# Patient Record
Sex: FEMALE | Race: WHITE | URBAN - NONMETROPOLITAN AREA
[De-identification: names, ages, dates, MRNs, and addresses within clinical notes are randomized per-mention and may not be internally consistent; named-entity substitution may affect disease eponyms.]

---

## 2019-02-12 ENCOUNTER — OFFICE VISIT (OUTPATIENT)
Dept: FAMILY MEDICINE CLINIC | Age: 19
End: 2019-02-12
Payer: COMMERCIAL

## 2019-02-12 VITALS
HEART RATE: 76 BPM | WEIGHT: 205 LBS | DIASTOLIC BLOOD PRESSURE: 64 MMHG | SYSTOLIC BLOOD PRESSURE: 120 MMHG | TEMPERATURE: 98.1 F | OXYGEN SATURATION: 98 % | RESPIRATION RATE: 18 BRPM

## 2019-02-12 DIAGNOSIS — J03.80 ACUTE VIRAL TONSILLITIS: Primary | ICD-10-CM

## 2019-02-12 DIAGNOSIS — B97.89 ACUTE VIRAL TONSILLITIS: Primary | ICD-10-CM

## 2019-02-12 LAB — STREPTOCOCCUS A RNA: NORMAL

## 2019-02-12 PROCEDURE — 87651 STREP A DNA AMP PROBE: CPT | Performed by: NURSE PRACTITIONER

## 2019-02-12 PROCEDURE — 99202 OFFICE O/P NEW SF 15 MIN: CPT | Performed by: NURSE PRACTITIONER

## 2019-02-12 RX ORDER — ETONOGESTREL AND ETHINYL ESTRADIOL 11.7; 2.7 MG/1; MG/1
INSERT, EXTENDED RELEASE VAGINAL
COMMUNITY
Start: 2016-10-02

## 2019-02-12 ASSESSMENT — ENCOUNTER SYMPTOMS
SHORTNESS OF BREATH: 0
COUGH: 0
TROUBLE SWALLOWING: 0
RHINORRHEA: 0
SORE THROAT: 1

## 2019-02-12 ASSESSMENT — PATIENT HEALTH QUESTIONNAIRE - PHQ9
SUM OF ALL RESPONSES TO PHQ QUESTIONS 1-9: 0
1. LITTLE INTEREST OR PLEASURE IN DOING THINGS: 0
2. FEELING DOWN, DEPRESSED OR HOPELESS: 0
SUM OF ALL RESPONSES TO PHQ9 QUESTIONS 1 & 2: 0
SUM OF ALL RESPONSES TO PHQ QUESTIONS 1-9: 0

## 2019-08-26 ENCOUNTER — OFFICE VISIT (OUTPATIENT)
Dept: FAMILY MEDICINE CLINIC | Age: 19
End: 2019-08-26
Payer: COMMERCIAL

## 2019-08-26 VITALS
HEART RATE: 79 BPM | TEMPERATURE: 98 F | DIASTOLIC BLOOD PRESSURE: 76 MMHG | SYSTOLIC BLOOD PRESSURE: 118 MMHG | OXYGEN SATURATION: 97 % | WEIGHT: 206.2 LBS | BODY MASS INDEX: 32.36 KG/M2 | HEIGHT: 67 IN | RESPIRATION RATE: 16 BRPM

## 2019-08-26 DIAGNOSIS — H10.31 ACUTE BACTERIAL CONJUNCTIVITIS OF RIGHT EYE: Primary | ICD-10-CM

## 2019-08-26 PROCEDURE — 99213 OFFICE O/P EST LOW 20 MIN: CPT | Performed by: NURSE PRACTITIONER

## 2019-08-26 RX ORDER — GENTAMICIN SULFATE 3 MG/ML
2 SOLUTION/ DROPS OPHTHALMIC 3 TIMES DAILY
Qty: 1 BOTTLE | Refills: 0 | Status: SHIPPED | OUTPATIENT
Start: 2019-08-26 | End: 2019-09-02

## 2019-08-26 ASSESSMENT — ENCOUNTER SYMPTOMS
SINUS PAIN: 0
SHORTNESS OF BREATH: 0
EYE ITCHING: 1
NAUSEA: 0
BACK PAIN: 0
EYE PAIN: 0
VOMITING: 0
COLOR CHANGE: 0
SORE THROAT: 0
SINUS PRESSURE: 0
DIARRHEA: 0
ABDOMINAL PAIN: 0
EYE REDNESS: 1
EYE DISCHARGE: 1
COUGH: 0

## 2023-11-27 ENCOUNTER — OFFICE VISIT (OUTPATIENT)
Dept: PRIMARY CARE | Facility: CLINIC | Age: 23
End: 2023-11-27
Payer: COMMERCIAL

## 2023-11-27 VITALS
OXYGEN SATURATION: 98 % | SYSTOLIC BLOOD PRESSURE: 128 MMHG | DIASTOLIC BLOOD PRESSURE: 77 MMHG | TEMPERATURE: 97.7 F | HEART RATE: 70 BPM | BODY MASS INDEX: 35.61 KG/M2 | HEIGHT: 67 IN | WEIGHT: 226.9 LBS

## 2023-11-27 DIAGNOSIS — R52 BODY ACHES: ICD-10-CM

## 2023-11-27 DIAGNOSIS — Z00.00 HEALTHCARE MAINTENANCE: Primary | ICD-10-CM

## 2023-11-27 DIAGNOSIS — Z87.2 HX OF DISCOID LUPUS ERYTHEMATOSUS: ICD-10-CM

## 2023-11-27 DIAGNOSIS — R53.83 OTHER FATIGUE: ICD-10-CM

## 2023-11-27 PROCEDURE — 99203 OFFICE O/P NEW LOW 30 MIN: CPT | Performed by: PHYSICIAN ASSISTANT

## 2023-11-27 PROCEDURE — 1036F TOBACCO NON-USER: CPT | Performed by: PHYSICIAN ASSISTANT

## 2023-11-27 ASSESSMENT — PATIENT HEALTH QUESTIONNAIRE - PHQ9
2. FEELING DOWN, DEPRESSED OR HOPELESS: NOT AT ALL
1. LITTLE INTEREST OR PLEASURE IN DOING THINGS: NOT AT ALL
SUM OF ALL RESPONSES TO PHQ9 QUESTIONS 1 AND 2: 0

## 2023-11-27 NOTE — PROGRESS NOTES
"Subjective   Patient ID: Rolo Hawkins is a 23 y.o. female who presents for Establish Care (Patient establishing as a new patient and last seen several years by her Pediatrician./Patient dx with lupus x 1 year and feeling tired with no motivation./Pap test done last year and wants to follow with [facility].).  HPI  Patient presents to [facility].    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  Patient treats this conservatively.  This was diagnosed by dermatology.    Patient is requesting a full set of screening labs including labs for autoimmune disorders.  Patient is preparing to be  and is concerned about overall health.  Patient does feel fatigued at times despite having plenty of sleep.  Patient denies any snoring.    Acutely, patient has no other complaints.    Review of Systems    Constitutional:  See HPI   Eye:  No recent visual problem.    Respiratory:  No shortness of breath, No cough.    Cardiovascular:  No chest pain.    Gastrointestinal:  No abdominal pain, No nausea, No vomiting.    Genitourinary:  No dysuria, No hematuria.    Musculoskeletal:  No decreased range of motion.    Integumentary:  No rash.    Neurologic:  Alert and oriented X4, No numbness, No tingling.    All other systems are negative     Objective     /77   Pulse 70   Temp 36.5 °C (97.7 °F) (Temporal)   Ht 1.702 m (5' 7\")   Wt 103 kg (226 lb 14.4 oz)   SpO2 98%   BMI 35.54 kg/m²     Physical Exam  General:  Alert and oriented, No acute distress.    Eye:  Pupils are equal, round and reactive to light, Extraocular movements are intact, Normal conjunctiva.    HENT:  Normocephalic, Normal hearing, Oral mucosa is moist, No pharyngeal erythema, No sinus tenderness.    Neck:  Supple, Non-tender, No lymphadenopathy.    Respiratory:  Lungs are clear to auscultation, Respirations are non-labored, Breath sounds are equal    Cardiovascular:  Normal rate, Regular rhythm.    Gastrointestinal:  " Non-distended.    Musculoskeletal: Normal range of motion, Normal strength, No tenderness, No swelling, No deformity, Normal gait.     Integumentary:  Warm, Dry, Intact, No pallor, No rash.    Neurologic:  Alert, Oriented, Normal sensory, Normal motor function, No focal deficits, Cranial Nerves II-XII are grossly intact   Psychiatric:  Cooperative, Appropriate mood & affect.    Assessment/Plan   Healthcare maintenance/fatigue/body aches: Full set of screening labs including autoimmune screening was ordered.  Further recommendations pending results.      History of discoid lupus: Patient does have lupus labs in the system from 2022 and they are negative.  Will repeat these and add rheumatoid labs.    Follow-up as needed unless labs dictate otherwise  Problem List Items Addressed This Visit    None  Visit Diagnoses       Healthcare maintenance    -  Primary    Relevant Orders    CBC    Comprehensive Metabolic Panel    Hemoglobin A1C    Lipid Panel    TSH with reflex to Free T4 if abnormal    Vitamin B12    [facility]    Anti-DNA Antibody, Double-Stranded    Sedimentation Rate    High sensitivity CRP    Rheumatoid factor    Other fatigue        Relevant Orders    CBC    Comprehensive Metabolic Panel    Hemoglobin A1C    Lipid Panel    TSH with reflex to Free T4 if abnormal    Vitamin B12    [facility]    Anti-DNA Antibody, Double-Stranded    Sedimentation Rate    High sensitivity CRP    Rheumatoid factor    Hx of discoid lupus erythematosus        Relevant Orders    [facility]    Anti-DNA Antibody, Double-Stranded    Sedimentation Rate    High sensitivity CRP    Rheumatoid factor    Body aches                Final diagnoses:   [Z00.00] Healthcare maintenance   [R53.83] Other fatigue   [Z87.2] Hx of discoid lupus erythematosus   [R52] Body aches

## 2023-11-28 ENCOUNTER — LAB (OUTPATIENT)
Dept: LAB | Facility: LAB | Age: 23
End: 2023-11-28
Payer: COMMERCIAL

## 2023-11-28 DIAGNOSIS — R53.83 OTHER FATIGUE: ICD-10-CM

## 2023-11-28 DIAGNOSIS — Z87.2 HX OF DISCOID LUPUS ERYTHEMATOSUS: ICD-10-CM

## 2023-11-28 DIAGNOSIS — Z00.00 HEALTHCARE MAINTENANCE: ICD-10-CM

## 2023-11-28 LAB
ALBUMIN SERPL BCP-MCNC: 4 G/DL (ref 3.4–5)
ALP SERPL-CCNC: 67 U/L (ref 33–110)
ALT SERPL W P-5'-P-CCNC: 22 U/L (ref 7–45)
ANION GAP SERPL CALC-SCNC: 12 MMOL/L (ref 10–20)
AST SERPL W P-5'-P-CCNC: 18 U/L (ref 9–39)
BILIRUB SERPL-MCNC: 0.3 MG/DL (ref 0–1.2)
BUN SERPL-MCNC: 12 MG/DL (ref 6–23)
CALCIUM SERPL-MCNC: 8.9 MG/DL (ref 8.6–10.3)
CHLORIDE SERPL-SCNC: 105 MMOL/L (ref 98–107)
CHOLEST SERPL-MCNC: 176 MG/DL (ref 0–199)
CHOLESTEROL/HDL RATIO: 3.4
CO2 SERPL-SCNC: 24 MMOL/L (ref 21–32)
CREAT SERPL-MCNC: 0.7 MG/DL (ref 0.5–1.05)
CRP SERPL HS-MCNC: 9.2 MG/L
DSDNA AB SER-ACNC: 1 IU/ML
ERYTHROCYTE [DISTWIDTH] IN BLOOD BY AUTOMATED COUNT: 12.9 % (ref 11.5–14.5)
ERYTHROCYTE [SEDIMENTATION RATE] IN BLOOD BY WESTERGREN METHOD: 21 MM/H (ref 0–20)
EST. AVERAGE GLUCOSE BLD GHB EST-MCNC: 108 MG/DL
GFR SERPL CREATININE-BSD FRML MDRD: >90 ML/MIN/1.73M*2
GLUCOSE SERPL-MCNC: 91 MG/DL (ref 74–99)
HBA1C MFR BLD: 5.4 %
HCT VFR BLD AUTO: 40.7 % (ref 36–46)
HDLC SERPL-MCNC: 52 MG/DL
HGB BLD-MCNC: 13.6 G/DL (ref 12–16)
LDLC SERPL CALC-MCNC: 95 MG/DL
MCH RBC QN AUTO: 30.4 PG (ref 26–34)
MCHC RBC AUTO-ENTMCNC: 33.4 G/DL (ref 32–36)
MCV RBC AUTO: 91 FL (ref 80–100)
NON HDL CHOLESTEROL: 124 MG/DL (ref 0–149)
NRBC BLD-RTO: 0 /100 WBCS (ref 0–0)
PLATELET # BLD AUTO: 243 X10*3/UL (ref 150–450)
POTASSIUM SERPL-SCNC: 4.3 MMOL/L (ref 3.5–5.3)
PROT SERPL-MCNC: 7 G/DL (ref 6.4–8.2)
RBC # BLD AUTO: 4.47 X10*6/UL (ref 4–5.2)
RHEUMATOID FACT SER NEPH-ACNC: <10 IU/ML (ref 0–15)
SODIUM SERPL-SCNC: 137 MMOL/L (ref 136–145)
TRIGL SERPL-MCNC: 147 MG/DL (ref 0–149)
TSH SERPL-ACNC: 2.87 MIU/L (ref 0.44–3.98)
VIT B12 SERPL-MCNC: 164 PG/ML (ref 211–911)
VLDL: 29 MG/DL (ref 0–40)
WBC # BLD AUTO: 8.3 X10*3/UL (ref 4.4–11.3)

## 2023-11-28 PROCEDURE — 80053 COMPREHEN METABOLIC PANEL: CPT

## 2023-11-28 PROCEDURE — 36415 COLL VENOUS BLD VENIPUNCTURE: CPT

## 2023-11-28 PROCEDURE — 85652 RBC SED RATE AUTOMATED: CPT

## 2023-11-28 PROCEDURE — 83036 HEMOGLOBIN GLYCOSYLATED A1C: CPT

## 2023-11-28 PROCEDURE — 82607 VITAMIN B-12: CPT

## 2023-11-28 PROCEDURE — 86431 RHEUMATOID FACTOR QUANT: CPT

## 2023-11-28 PROCEDURE — 86141 C-REACTIVE PROTEIN HS: CPT

## 2023-11-28 PROCEDURE — 84443 ASSAY THYROID STIM HORMONE: CPT

## 2023-11-28 PROCEDURE — 85027 COMPLETE CBC AUTOMATED: CPT

## 2023-11-28 PROCEDURE — 86038 ANTINUCLEAR ANTIBODIES: CPT

## 2023-11-28 PROCEDURE — 86225 DNA ANTIBODY NATIVE: CPT

## 2023-11-28 PROCEDURE — 80061 LIPID PANEL: CPT

## 2023-12-03 LAB
ANA PATTERN: ABNORMAL
ANA SER QL HEP2 SUBST: POSITIVE
ANA TITR SER IF: ABNORMAL {TITER}

## 2023-12-04 DIAGNOSIS — R76.8 POSITIVE ANA (ANTINUCLEAR ANTIBODY): Primary | ICD-10-CM

## 2023-12-04 DIAGNOSIS — R70.0 ELEVATED ERYTHROCYTE SEDIMENTATION RATE: ICD-10-CM
